# Patient Record
Sex: FEMALE | Race: WHITE | NOT HISPANIC OR LATINO | URBAN - METROPOLITAN AREA
[De-identification: names, ages, dates, MRNs, and addresses within clinical notes are randomized per-mention and may not be internally consistent; named-entity substitution may affect disease eponyms.]

---

## 2021-05-20 ENCOUNTER — OFFICE VISIT (OUTPATIENT)
Dept: URBAN - METROPOLITAN AREA CLINIC 70 | Facility: CLINIC | Age: 66
End: 2021-05-20

## 2021-05-24 ENCOUNTER — OFFICE VISIT (OUTPATIENT)
Dept: URBAN - METROPOLITAN AREA CLINIC 70 | Facility: CLINIC | Age: 66
End: 2021-05-24

## 2021-05-24 RX ORDER — TRAMADOL HYDROCHLORIDE 50 MG/1
TABLET, COATED ORAL
Qty: 60 UNSPECIFIED | Status: ACTIVE | COMMUNITY

## 2021-05-24 RX ORDER — LEVOTHYROXINE SODIUM 0.15 MG/1
TABLET ORAL
Qty: 90 UNSPECIFIED | Status: ACTIVE | COMMUNITY

## 2021-05-24 RX ORDER — NORTRIPTYLINE HYDROCHLORIDE 50 MG/1
CAPSULE ORAL
Qty: 90 UNSPECIFIED | Status: ACTIVE | COMMUNITY

## 2021-05-24 RX ORDER — CEPHALEXIN 500 MG/1
CAPSULE ORAL
Qty: 30 UNSPECIFIED | Status: ACTIVE | COMMUNITY

## 2021-05-24 RX ORDER — QUETIAPINE 400 MG/1
TABLET, FILM COATED, EXTENDED RELEASE ORAL
Qty: 90 UNSPECIFIED | Status: ACTIVE | COMMUNITY

## 2021-05-24 RX ORDER — HYDROCODONE BITARTRATE AND ACETAMINOPHEN 7.5; 325 MG/1; MG/1
TABLET ORAL
Qty: 30 UNSPECIFIED | Status: ACTIVE | COMMUNITY

## 2021-05-24 RX ORDER — POTASSIUM CHLORIDE 750 MG/1
CAPSULE, EXTENDED RELEASE ORAL
Qty: 90 UNSPECIFIED | Status: ACTIVE | COMMUNITY

## 2021-05-24 RX ORDER — ATENOLOL 50 MG/1
TABLET ORAL
Qty: 90 UNSPECIFIED | Status: ACTIVE | COMMUNITY

## 2021-05-24 RX ORDER — PEN NEEDLE, DIABETIC 31 GX5/16"
NEEDLE, DISPOSABLE MISCELLANEOUS
Qty: 200 UNSPECIFIED | Status: ACTIVE | COMMUNITY

## 2021-05-24 RX ORDER — SERTRALINE HYDROCHLORIDE 100 MG/1
TABLET, FILM COATED ORAL
Qty: 90 UNSPECIFIED | Status: ACTIVE | COMMUNITY

## 2021-05-24 RX ORDER — NITROFURANTOIN (MONOHYDRATE/MACROCRYSTALS) 75; 25 MG/1; MG/1
CAPSULE ORAL
Qty: 20 UNSPECIFIED | Status: ACTIVE | COMMUNITY

## 2021-05-24 RX ORDER — NITROFURANTOIN MACROCRYSTALS 100 MG/1
CAPSULE ORAL
Qty: 25 UNSPECIFIED | Status: ACTIVE | COMMUNITY

## 2021-05-24 RX ORDER — HUMAN INSULIN 100 [IU]/ML
INJECTION, SUSPENSION SUBCUTANEOUS
Qty: 60 UNSPECIFIED | Status: ACTIVE | COMMUNITY

## 2021-05-24 RX ORDER — FOLIC ACID 1 MG/1
TABLET ORAL
Qty: 90 UNSPECIFIED | Status: ACTIVE | COMMUNITY

## 2021-05-24 RX ORDER — TORSEMIDE 20 MG/1
TABLET ORAL
Qty: 90 UNSPECIFIED | Status: ACTIVE | COMMUNITY

## 2021-05-24 RX ORDER — TOPIRAMATE 50 MG/1
CAPSULE, EXTENDED RELEASE ORAL
Qty: 180 UNSPECIFIED | Status: ACTIVE | COMMUNITY

## 2021-05-24 RX ORDER — ATORVASTATIN CALCIUM 40 MG/1
TABLET ORAL
Qty: 90 UNSPECIFIED | Status: ACTIVE | COMMUNITY

## 2021-05-24 RX ORDER — INSULIN DEGLUDEC INJECTION 200 U/ML
INJECTION, SOLUTION SUBCUTANEOUS
Qty: 30 UNSPECIFIED | Status: ACTIVE | COMMUNITY

## 2021-05-24 RX ORDER — QUINAPRIL 20 MG/1
TABLET ORAL
Qty: 90 UNSPECIFIED | Status: ACTIVE | COMMUNITY

## 2021-05-25 ENCOUNTER — LAB OUTSIDE AN ENCOUNTER (OUTPATIENT)
Dept: URBAN - METROPOLITAN AREA CLINIC 70 | Facility: CLINIC | Age: 66
End: 2021-05-25

## 2021-05-25 ENCOUNTER — OFFICE VISIT (OUTPATIENT)
Dept: URBAN - METROPOLITAN AREA CLINIC 70 | Facility: CLINIC | Age: 66
End: 2021-05-25
Payer: OTHER GOVERNMENT

## 2021-05-25 ENCOUNTER — WEB ENCOUNTER (OUTPATIENT)
Dept: URBAN - METROPOLITAN AREA CLINIC 70 | Facility: CLINIC | Age: 66
End: 2021-05-25

## 2021-05-25 DIAGNOSIS — Z12.11 SCREEN FOR COLON CANCER: ICD-10-CM

## 2021-05-25 DIAGNOSIS — Z80.0 FAMILY HISTORY OF COLON CANCER: ICD-10-CM

## 2021-05-25 DIAGNOSIS — K74.60 CIRRHOSIS: ICD-10-CM

## 2021-05-25 DIAGNOSIS — K76.6 PORTAL HYPERTENSION: ICD-10-CM

## 2021-05-25 DIAGNOSIS — D69.6 THROMBOCYTOPENIA: ICD-10-CM

## 2021-05-25 PROCEDURE — 99204 OFFICE O/P NEW MOD 45 MIN: CPT | Performed by: INTERNAL MEDICINE

## 2021-05-25 RX ORDER — CEPHALEXIN 500 MG/1
CAPSULE ORAL
Qty: 30 UNSPECIFIED | Status: ACTIVE | COMMUNITY

## 2021-05-25 RX ORDER — ATORVASTATIN CALCIUM 40 MG/1
TABLET ORAL
Qty: 90 UNSPECIFIED | Status: ACTIVE | COMMUNITY

## 2021-05-25 RX ORDER — TRAMADOL HYDROCHLORIDE 50 MG/1
TABLET, COATED ORAL
Qty: 60 UNSPECIFIED | Status: ACTIVE | COMMUNITY

## 2021-05-25 RX ORDER — FOLIC ACID 1 MG/1
TABLET ORAL
Qty: 90 UNSPECIFIED | Status: ACTIVE | COMMUNITY

## 2021-05-25 RX ORDER — TORSEMIDE 20 MG/1
TABLET ORAL
Qty: 90 UNSPECIFIED | Status: ACTIVE | COMMUNITY

## 2021-05-25 RX ORDER — TOPIRAMATE 50 MG/1
CAPSULE, EXTENDED RELEASE ORAL
Qty: 180 UNSPECIFIED | Status: ACTIVE | COMMUNITY

## 2021-05-25 RX ORDER — NITROFURANTOIN MACROCRYSTALS 100 MG/1
CAPSULE ORAL
Qty: 25 UNSPECIFIED | Status: ACTIVE | COMMUNITY

## 2021-05-25 RX ORDER — ATENOLOL 50 MG/1
TABLET ORAL
Qty: 90 UNSPECIFIED | Status: ACTIVE | COMMUNITY

## 2021-05-25 RX ORDER — NORTRIPTYLINE HYDROCHLORIDE 50 MG/1
CAPSULE ORAL
Qty: 90 UNSPECIFIED | Status: ACTIVE | COMMUNITY

## 2021-05-25 RX ORDER — NITROFURANTOIN (MONOHYDRATE/MACROCRYSTALS) 75; 25 MG/1; MG/1
CAPSULE ORAL
Qty: 20 UNSPECIFIED | Status: ACTIVE | COMMUNITY

## 2021-05-25 RX ORDER — LEVOTHYROXINE SODIUM 0.15 MG/1
TABLET ORAL
Qty: 90 UNSPECIFIED | Status: ACTIVE | COMMUNITY

## 2021-05-25 RX ORDER — QUINAPRIL 20 MG/1
TABLET ORAL
Qty: 90 UNSPECIFIED | Status: ACTIVE | COMMUNITY

## 2021-05-25 RX ORDER — INSULIN DEGLUDEC INJECTION 200 U/ML
INJECTION, SOLUTION SUBCUTANEOUS
Qty: 30 UNSPECIFIED | Status: ACTIVE | COMMUNITY

## 2021-05-25 RX ORDER — HYDROCODONE BITARTRATE AND ACETAMINOPHEN 7.5; 325 MG/1; MG/1
TABLET ORAL
Qty: 30 UNSPECIFIED | Status: ACTIVE | COMMUNITY

## 2021-05-25 RX ORDER — POTASSIUM CHLORIDE 750 MG/1
CAPSULE, EXTENDED RELEASE ORAL
Qty: 90 UNSPECIFIED | Status: ACTIVE | COMMUNITY

## 2021-05-25 RX ORDER — HUMAN INSULIN 100 [IU]/ML
INJECTION, SUSPENSION SUBCUTANEOUS
Qty: 60 UNSPECIFIED | Status: ACTIVE | COMMUNITY

## 2021-05-25 RX ORDER — PEN NEEDLE, DIABETIC 31 GX5/16"
NEEDLE, DISPOSABLE MISCELLANEOUS
Qty: 200 UNSPECIFIED | Status: ACTIVE | COMMUNITY

## 2021-05-25 RX ORDER — QUETIAPINE 400 MG/1
TABLET, FILM COATED, EXTENDED RELEASE ORAL
Qty: 90 UNSPECIFIED | Status: ACTIVE | COMMUNITY

## 2021-05-25 RX ORDER — SERTRALINE HYDROCHLORIDE 100 MG/1
TABLET, FILM COATED ORAL
Qty: 90 UNSPECIFIED | Status: ACTIVE | COMMUNITY

## 2021-05-25 NOTE — HPI-TODAY'S VISIT:
Pt presents for evaluation of cirrhosis. They were diagnosed with cirrhosis 1 month ago. Cause of cirrhosis is unknown. They currently do not drink alcohol. No significant alcohol use in the past. They deny drug use currently or in the past. Recent labs include CBC with hgb 10.2, MCV 91 and platelet 93,000. Recent imaging includes CT scan on 4/12/21 that showed cirrhosis, gallstones, mild splenomegaly, varices in the LUQ, and bilateral nonobstructing kidney stones. EGD has been done 5years ago in [facility] and was reportedly normal. She also reports a normal colonoscopy 5 years ago. Her father had colon cancer.

## 2021-06-17 ENCOUNTER — OFFICE VISIT (OUTPATIENT)
Dept: URBAN - METROPOLITAN AREA CLINIC 70 | Facility: CLINIC | Age: 66
End: 2021-06-17

## 2021-06-24 ENCOUNTER — TELEPHONE ENCOUNTER (OUTPATIENT)
Dept: URBAN - METROPOLITAN AREA SURGERY CENTER 30 | Facility: SURGERY CENTER | Age: 66
End: 2021-06-24

## 2021-08-27 ENCOUNTER — OFFICE VISIT (OUTPATIENT)
Dept: URBAN - METROPOLITAN AREA MEDICAL CENTER 42 | Facility: MEDICAL CENTER | Age: 66
End: 2021-08-27

## 2021-11-26 ENCOUNTER — OUT OF OFFICE VISIT (OUTPATIENT)
Dept: URBAN - METROPOLITAN AREA MEDICAL CENTER 9 | Facility: MEDICAL CENTER | Age: 66
End: 2021-11-26
Payer: OTHER GOVERNMENT

## 2021-11-26 DIAGNOSIS — K92.1 ACUTE MELENA: ICD-10-CM

## 2021-11-26 DIAGNOSIS — K31.89 ACQUIRED DEFORMITY OF DUODENUM: ICD-10-CM

## 2021-11-26 DIAGNOSIS — K31.811 ACQUIRED ARTERIOVENOUS MALFORMATION OF STOMACH WITH HEMORRHAGE: ICD-10-CM

## 2021-11-26 DIAGNOSIS — K21.9 ACID REFLUX: ICD-10-CM

## 2021-11-26 DIAGNOSIS — D64.89 ANEMIA DUE TO OTHER CAUSE: ICD-10-CM

## 2021-11-26 DIAGNOSIS — K74.69 CIRRHOSIS, CRYPTOGENIC: ICD-10-CM

## 2021-11-26 PROCEDURE — 43255 EGD CONTROL BLEEDING ANY: CPT | Performed by: STUDENT IN AN ORGANIZED HEALTH CARE EDUCATION/TRAINING PROGRAM

## 2021-11-26 PROCEDURE — G8427 DOCREV CUR MEDS BY ELIG CLIN: HCPCS | Performed by: STUDENT IN AN ORGANIZED HEALTH CARE EDUCATION/TRAINING PROGRAM

## 2021-11-26 PROCEDURE — 43239 EGD BIOPSY SINGLE/MULTIPLE: CPT | Performed by: STUDENT IN AN ORGANIZED HEALTH CARE EDUCATION/TRAINING PROGRAM

## 2021-11-26 PROCEDURE — 99233 SBSQ HOSP IP/OBS HIGH 50: CPT | Performed by: STUDENT IN AN ORGANIZED HEALTH CARE EDUCATION/TRAINING PROGRAM

## 2021-11-26 PROCEDURE — 99222 1ST HOSP IP/OBS MODERATE 55: CPT | Performed by: STUDENT IN AN ORGANIZED HEALTH CARE EDUCATION/TRAINING PROGRAM

## 2022-01-20 ENCOUNTER — LAB OUTSIDE AN ENCOUNTER (OUTPATIENT)
Dept: URBAN - METROPOLITAN AREA CLINIC 70 | Facility: CLINIC | Age: 67
End: 2022-01-20

## 2022-01-20 ENCOUNTER — OFFICE VISIT (OUTPATIENT)
Dept: URBAN - METROPOLITAN AREA CLINIC 70 | Facility: CLINIC | Age: 67
End: 2022-01-20
Payer: OTHER GOVERNMENT

## 2022-01-20 DIAGNOSIS — Z12.11 SCREEN FOR COLON CANCER: ICD-10-CM

## 2022-01-20 DIAGNOSIS — D50.9 CHRONIC IRON DEFICIENCY ANEMIA: ICD-10-CM

## 2022-01-20 DIAGNOSIS — K74.60 CIRRHOSIS: ICD-10-CM

## 2022-01-20 DIAGNOSIS — Z80.0 FAMILY HISTORY OF COLON CANCER: ICD-10-CM

## 2022-01-20 DIAGNOSIS — K31.819 GAVE (GASTRIC ANTRAL VASCULAR ECTASIA): ICD-10-CM

## 2022-01-20 DIAGNOSIS — K27.9 PEPTIC ULCER DISEASE: ICD-10-CM

## 2022-01-20 PROCEDURE — 99214 OFFICE O/P EST MOD 30 MIN: CPT | Performed by: NURSE PRACTITIONER

## 2022-01-20 RX ORDER — FOLIC ACID 1 MG/1
TABLET ORAL
Qty: 90 UNSPECIFIED | Status: ACTIVE | COMMUNITY

## 2022-01-20 RX ORDER — HUMAN INSULIN 100 [IU]/ML
INJECTION, SUSPENSION SUBCUTANEOUS
Qty: 60 UNSPECIFIED | Status: ACTIVE | COMMUNITY

## 2022-01-20 RX ORDER — TOPIRAMATE 50 MG/1
CAPSULE, EXTENDED RELEASE ORAL
Qty: 180 UNSPECIFIED | Status: ACTIVE | COMMUNITY

## 2022-01-20 RX ORDER — INSULIN DEGLUDEC INJECTION 200 U/ML
INJECTION, SOLUTION SUBCUTANEOUS
Qty: 30 UNSPECIFIED | Status: ACTIVE | COMMUNITY

## 2022-01-20 RX ORDER — CEPHALEXIN 500 MG/1
CAPSULE ORAL
Qty: 30 UNSPECIFIED | Status: ACTIVE | COMMUNITY

## 2022-01-20 RX ORDER — NITROFURANTOIN (MONOHYDRATE/MACROCRYSTALS) 75; 25 MG/1; MG/1
CAPSULE ORAL
Qty: 20 UNSPECIFIED | Status: ACTIVE | COMMUNITY

## 2022-01-20 RX ORDER — HYDROCODONE BITARTRATE AND ACETAMINOPHEN 7.5; 325 MG/1; MG/1
TABLET ORAL
Qty: 30 UNSPECIFIED | Status: ACTIVE | COMMUNITY

## 2022-01-20 RX ORDER — TRAMADOL HYDROCHLORIDE 50 MG/1
TABLET, COATED ORAL
Qty: 60 UNSPECIFIED | Status: ACTIVE | COMMUNITY

## 2022-01-20 RX ORDER — ATORVASTATIN CALCIUM 40 MG/1
TABLET ORAL
Qty: 90 UNSPECIFIED | Status: ACTIVE | COMMUNITY

## 2022-01-20 RX ORDER — NORTRIPTYLINE HYDROCHLORIDE 50 MG/1
CAPSULE ORAL
Qty: 90 UNSPECIFIED | Status: ACTIVE | COMMUNITY

## 2022-01-20 RX ORDER — POTASSIUM CHLORIDE 750 MG/1
CAPSULE, EXTENDED RELEASE ORAL
Qty: 90 UNSPECIFIED | Status: ACTIVE | COMMUNITY

## 2022-01-20 RX ORDER — NITROFURANTOIN MACROCRYSTALS 100 MG/1
CAPSULE ORAL
Qty: 25 UNSPECIFIED | Status: ACTIVE | COMMUNITY

## 2022-01-20 RX ORDER — ATENOLOL 50 MG/1
TABLET ORAL
Qty: 90 UNSPECIFIED | Status: ACTIVE | COMMUNITY

## 2022-01-20 RX ORDER — SERTRALINE HYDROCHLORIDE 100 MG/1
TABLET, FILM COATED ORAL
Qty: 90 UNSPECIFIED | Status: ACTIVE | COMMUNITY

## 2022-01-20 RX ORDER — PEN NEEDLE, DIABETIC 31 GX5/16"
NEEDLE, DISPOSABLE MISCELLANEOUS
Qty: 200 UNSPECIFIED | Status: ACTIVE | COMMUNITY

## 2022-01-20 RX ORDER — LEVOTHYROXINE SODIUM 0.15 MG/1
TABLET ORAL
Qty: 90 UNSPECIFIED | Status: ACTIVE | COMMUNITY

## 2022-01-20 RX ORDER — ATENOLOL 50 MG/1
TABLET ORAL
OUTPATIENT

## 2022-01-20 RX ORDER — QUETIAPINE 400 MG/1
TABLET, FILM COATED, EXTENDED RELEASE ORAL
Qty: 90 UNSPECIFIED | Status: ACTIVE | COMMUNITY

## 2022-01-20 RX ORDER — PANTOPRAZOLE SODIUM 40 MG/1
1 TABLET TABLET, DELAYED RELEASE ORAL TWICE A DAY
Qty: 180 TABLET | Refills: 3 | OUTPATIENT
Start: 2022-01-20

## 2022-01-20 RX ORDER — QUINAPRIL 20 MG/1
TABLET ORAL
Qty: 90 UNSPECIFIED | Status: ACTIVE | COMMUNITY

## 2022-01-20 RX ORDER — TORSEMIDE 20 MG/1
TABLET ORAL
Qty: 90 UNSPECIFIED | Status: ACTIVE | COMMUNITY

## 2022-01-20 NOTE — HPI-TODAY'S VISIT:
OV 5/25/21: Pt presents for evaluation of cirrhosis. They were diagnosed with cirrhosis 1 month ago. Cause of cirrhosis is unknown. They currently do not drink alcohol. No significant alcohol use in the past. They deny drug use currently or in the past. Recent labs include CBC with hgb 10.2, MCV 91 and platelet 93,000. Recent imaging includes CT scan on 4/12/21 that showed cirrhosis, gallstones, mild splenomegaly, varices in the LUQ, and bilateral nonobstructing kidney stones. EGD has been done 5years ago in [place] and was reportedly normal. She also reports a normal colonoscopy 5 years ago. Her father had colon cancer. --------------------------------------------------------------------------------------------------- Today: 1/20/22: Since last OV, patient has has been hospitalized twice for UGIB/anemia. 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 Her second episode was a hospitalization from 12/13/21 to 12/18/21 for melena/anemia with Hgb on admission found to be 6.8 requiring transfusion with PRBCs with repeat EGD on 12/15/21 with findings of no esophageal varices, gastric ulcer  located at the distal antrum at the greater curvaure with visible vessel s/p endoclip x 2, and mild portal hypertension at the  with mosaic pattern mucosa but no gastric varices. She was discharged home with H/H 8.5/30.4 on atenolol, iron supplement, lactulose, and pantoprazole BID. Today she reports dark brown stools with taking iron but no active signs of bleeding such as hematemesis, melena, or hematochezia. Has not been taking PPI since discharge (off ~ 1 month). Previously ordered labs at last OV to r/o autoimmune or viral hepatitis has not been completed.  Denies CP, SOB, dizziness/lightheadedness, abdominal pain, N/V, wt loss, constipation, or diarrhea.

## 2022-01-23 PROBLEM — 34742003: Status: ACTIVE | Noted: 2021-05-25

## 2022-01-23 PROBLEM — 415116008: Status: ACTIVE | Noted: 2021-05-25

## 2022-03-23 ENCOUNTER — OFFICE VISIT (OUTPATIENT)
Dept: URBAN - METROPOLITAN AREA MEDICAL CENTER 42 | Facility: MEDICAL CENTER | Age: 67
End: 2022-03-23

## 2022-06-08 ENCOUNTER — OFFICE VISIT (OUTPATIENT)
Dept: URBAN - METROPOLITAN AREA MEDICAL CENTER 42 | Facility: MEDICAL CENTER | Age: 67
End: 2022-06-08

## 2022-06-08 ENCOUNTER — OFFICE VISIT (OUTPATIENT)
Dept: URBAN - METROPOLITAN AREA MEDICAL CENTER 42 | Facility: MEDICAL CENTER | Age: 67
End: 2022-06-08
Payer: OTHER GOVERNMENT

## 2022-06-08 DIAGNOSIS — K31.819 ACQUIRED ARTERIOVENOUS MALFORMATION OF STOMACH: ICD-10-CM

## 2022-06-08 PROCEDURE — 43270 EGD LESION ABLATION: CPT | Performed by: INTERNAL MEDICINE

## 2022-07-21 ENCOUNTER — OFFICE VISIT (OUTPATIENT)
Dept: URBAN - METROPOLITAN AREA CLINIC 70 | Facility: CLINIC | Age: 67
End: 2022-07-21
Payer: OTHER GOVERNMENT

## 2022-07-21 ENCOUNTER — LAB OUTSIDE AN ENCOUNTER (OUTPATIENT)
Dept: URBAN - METROPOLITAN AREA CLINIC 70 | Facility: CLINIC | Age: 67
End: 2022-07-21

## 2022-07-21 VITALS
HEIGHT: 65 IN | SYSTOLIC BLOOD PRESSURE: 116 MMHG | DIASTOLIC BLOOD PRESSURE: 69 MMHG | WEIGHT: 293 LBS | TEMPERATURE: 98.9 F | BODY MASS INDEX: 48.82 KG/M2 | HEART RATE: 60 BPM

## 2022-07-21 DIAGNOSIS — Z80.0 FAMILY HISTORY OF COLON CANCER: ICD-10-CM

## 2022-07-21 DIAGNOSIS — K31.819 GAVE (GASTRIC ANTRAL VASCULAR ECTASIA): ICD-10-CM

## 2022-07-21 DIAGNOSIS — K74.60 CIRRHOSIS: ICD-10-CM

## 2022-07-21 DIAGNOSIS — K27.9 PEPTIC ULCER DISEASE: ICD-10-CM

## 2022-07-21 DIAGNOSIS — D50.9 CHRONIC IRON DEFICIENCY ANEMIA: ICD-10-CM

## 2022-07-21 DIAGNOSIS — Z12.11 SCREEN FOR COLON CANCER: ICD-10-CM

## 2022-07-21 PROBLEM — 87522002: Status: ACTIVE | Noted: 2022-01-20

## 2022-07-21 PROBLEM — 13200003: Status: ACTIVE | Noted: 2022-01-20

## 2022-07-21 PROCEDURE — 99214 OFFICE O/P EST MOD 30 MIN: CPT | Performed by: NURSE PRACTITIONER

## 2022-07-21 RX ORDER — NITROFURANTOIN (MONOHYDRATE/MACROCRYSTALS) 75; 25 MG/1; MG/1
CAPSULE ORAL
Qty: 20 UNSPECIFIED | Status: ACTIVE | COMMUNITY

## 2022-07-21 RX ORDER — SERTRALINE HYDROCHLORIDE 100 MG/1
TABLET, FILM COATED ORAL
Qty: 90 UNSPECIFIED | Status: ACTIVE | COMMUNITY

## 2022-07-21 RX ORDER — QUINAPRIL 20 MG/1
TABLET ORAL
Qty: 90 UNSPECIFIED | Status: ACTIVE | COMMUNITY

## 2022-07-21 RX ORDER — ATORVASTATIN CALCIUM 40 MG/1
TABLET ORAL
Qty: 90 UNSPECIFIED | Status: ACTIVE | COMMUNITY

## 2022-07-21 RX ORDER — ATENOLOL 50 MG/1
TABLET ORAL
OUTPATIENT

## 2022-07-21 RX ORDER — LEVOTHYROXINE SODIUM 0.15 MG/1
TABLET ORAL
Qty: 90 UNSPECIFIED | Status: ACTIVE | COMMUNITY

## 2022-07-21 RX ORDER — TOPIRAMATE 50 MG/1
CAPSULE, EXTENDED RELEASE ORAL
Qty: 180 UNSPECIFIED | Status: ACTIVE | COMMUNITY

## 2022-07-21 RX ORDER — HYDROCODONE BITARTRATE AND ACETAMINOPHEN 7.5; 325 MG/1; MG/1
TABLET ORAL
Qty: 30 UNSPECIFIED | Status: ACTIVE | COMMUNITY

## 2022-07-21 RX ORDER — CEPHALEXIN 500 MG/1
CAPSULE ORAL
Qty: 30 UNSPECIFIED | Status: ACTIVE | COMMUNITY

## 2022-07-21 RX ORDER — TRAMADOL HYDROCHLORIDE 50 MG/1
TABLET, COATED ORAL
Qty: 60 UNSPECIFIED | Status: ACTIVE | COMMUNITY

## 2022-07-21 RX ORDER — LACTULOSE 10 G/15ML
15 ML AS NEEDED SOLUTION ORAL ONCE A DAY
OUTPATIENT

## 2022-07-21 RX ORDER — HUMAN INSULIN 100 [IU]/ML
INJECTION, SUSPENSION SUBCUTANEOUS
Qty: 60 UNSPECIFIED | Status: ACTIVE | COMMUNITY

## 2022-07-21 RX ORDER — POTASSIUM CHLORIDE 750 MG/1
CAPSULE, EXTENDED RELEASE ORAL
Qty: 90 UNSPECIFIED | Status: ACTIVE | COMMUNITY

## 2022-07-21 RX ORDER — INSULIN DEGLUDEC INJECTION 200 U/ML
INJECTION, SOLUTION SUBCUTANEOUS
Qty: 30 UNSPECIFIED | Status: ACTIVE | COMMUNITY

## 2022-07-21 RX ORDER — PANTOPRAZOLE SODIUM 40 MG/1
1 TABLET TABLET, DELAYED RELEASE ORAL TWICE A DAY
OUTPATIENT
Start: 2022-01-20

## 2022-07-21 RX ORDER — PANTOPRAZOLE SODIUM 40 MG/1
1 TABLET TABLET, DELAYED RELEASE ORAL TWICE A DAY
Qty: 180 TABLET | Refills: 3 | Status: ACTIVE | COMMUNITY
Start: 2022-01-20

## 2022-07-21 RX ORDER — QUETIAPINE 400 MG/1
TABLET, FILM COATED, EXTENDED RELEASE ORAL
Qty: 90 UNSPECIFIED | Status: ACTIVE | COMMUNITY

## 2022-07-21 RX ORDER — TORSEMIDE 20 MG/1
TABLET ORAL
Qty: 90 UNSPECIFIED | Status: ACTIVE | COMMUNITY

## 2022-07-21 RX ORDER — NITROFURANTOIN MACROCRYSTALS 100 MG/1
CAPSULE ORAL
Qty: 25 UNSPECIFIED | Status: ACTIVE | COMMUNITY

## 2022-07-21 RX ORDER — ATENOLOL 50 MG/1
TABLET ORAL
Status: ACTIVE | COMMUNITY

## 2022-07-21 RX ORDER — LACTULOSE 10 G/15ML
15 ML AS NEEDED SOLUTION ORAL ONCE A DAY
Status: ACTIVE | COMMUNITY

## 2022-07-21 RX ORDER — NORTRIPTYLINE HYDROCHLORIDE 50 MG/1
CAPSULE ORAL
Qty: 90 UNSPECIFIED | Status: ACTIVE | COMMUNITY

## 2022-07-21 RX ORDER — FOLIC ACID 1 MG/1
TABLET ORAL
Qty: 90 UNSPECIFIED | Status: ACTIVE | COMMUNITY

## 2022-07-21 RX ORDER — PEN NEEDLE, DIABETIC 31 GX5/16"
NEEDLE, DISPOSABLE MISCELLANEOUS
Qty: 200 UNSPECIFIED | Status: ACTIVE | COMMUNITY

## 2022-07-21 NOTE — HPI-TODAY'S VISIT:
OV 5/25/21: Pt presents for evaluation of cirrhosis. They were diagnosed with cirrhosis 1 month ago. Cause of cirrhosis is unknown. They currently do not drink alcohol. No significant alcohol use in the past. They deny drug use currently or in the past. Recent labs include CBC with hgb 10.2, MCV 91 and platelet 93,000. Recent imaging includes CT scan on 4/12/21 that showed cirrhosis, gallstones, mild splenomegaly, varices in the LUQ, and bilateral nonobstructing kidney stones. EGD has been done 5years ago in [place] and was reportedly normal. She also reports a normal colonoscopy 5 years ago. Her father had colon cancer. --------------------------------------------------------------------------------------------------- OV 1/20/22: Since last OV, patient has has been hospitalized twice for UGIB/anemia. 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 Her second episode was a hospitalization from 12/13/21 to 12/18/21 for melena/anemia with Hgb on admission found to be 6.8 requiring transfusion with PRBCs with repeat EGD on 12/15/21 with findings of no esophageal varices, gastric ulcer  located at the distal antrum at the greater curvaure with visible vessel s/p endoclip x 2, and mild portal hypertension at the  with mosaic pattern mucosa but no gastric varices. She was discharged home with H/H 8.5/30.4 on atenolol, iron supplement, lactulose, and pantoprazole BID. Today she reports dark brown stools with taking iron but no active signs of bleeding such as hematemesis, melena, or hematochezia. Has not been taking PPI since discharge (off ~ 1 month). Previously ordered labs at last OV to r/o autoimmune or viral hepatitis has not been completed.  Denies CP, SOB, dizziness/lightheadedness, abdominal pain, N/V, wt loss, constipation, or diarrhea. --------------------------------------------------------------------------- Today 7/21/22: Patient presents today for follow up. She underwent repeat EGD on 6/8/22 with findings of no varices, ulcer healed and GAVE with no active bleeding but treated with APC. Most recent labs on 7/2/22 showed H/H improving at 8.4/26.9 along with plt 104, T.ibll 1.3 but alk phos, AST, ALT WNL. Today she reports doing well. No active signs of GI bleeding such as hematemesis, melena, or hematochezia. Compliant with taking daily PPI. No evidence of jaundice, ascites, or HE. No new or current GI complaints or concerns.

## 2022-11-17 ENCOUNTER — OFFICE VISIT (OUTPATIENT)
Dept: URBAN - METROPOLITAN AREA MEDICAL CENTER 42 | Facility: MEDICAL CENTER | Age: 67
End: 2022-11-17
Payer: OTHER GOVERNMENT

## 2022-11-17 DIAGNOSIS — Z53.8 FAILED ATTEMPTED SURGICAL PROCEDURE: ICD-10-CM

## 2022-11-17 DIAGNOSIS — Z12.11 COLON CANCER SCREENING: ICD-10-CM

## 2022-11-17 PROCEDURE — 45378 DIAGNOSTIC COLONOSCOPY: CPT | Performed by: INTERNAL MEDICINE

## 2022-11-17 RX ORDER — PEN NEEDLE, DIABETIC 31 GX5/16"
NEEDLE, DISPOSABLE MISCELLANEOUS
Qty: 200 UNSPECIFIED | Status: ACTIVE | COMMUNITY

## 2022-11-17 RX ORDER — TRAMADOL HYDROCHLORIDE 50 MG/1
TABLET, COATED ORAL
Qty: 60 UNSPECIFIED | Status: ACTIVE | COMMUNITY

## 2022-11-17 RX ORDER — ATENOLOL 50 MG/1
TABLET ORAL
Status: ACTIVE | COMMUNITY

## 2022-11-17 RX ORDER — SERTRALINE HYDROCHLORIDE 100 MG/1
TABLET, FILM COATED ORAL
Qty: 90 UNSPECIFIED | Status: ACTIVE | COMMUNITY

## 2022-11-17 RX ORDER — HYDROCODONE BITARTRATE AND ACETAMINOPHEN 7.5; 325 MG/1; MG/1
TABLET ORAL
Qty: 30 UNSPECIFIED | Status: ACTIVE | COMMUNITY

## 2022-11-17 RX ORDER — INSULIN DEGLUDEC INJECTION 200 U/ML
INJECTION, SOLUTION SUBCUTANEOUS
Qty: 30 UNSPECIFIED | Status: ACTIVE | COMMUNITY

## 2022-11-17 RX ORDER — NITROFURANTOIN MACROCRYSTALS 100 MG/1
CAPSULE ORAL
Qty: 25 UNSPECIFIED | Status: ACTIVE | COMMUNITY

## 2022-11-17 RX ORDER — ATORVASTATIN CALCIUM 40 MG/1
TABLET ORAL
Qty: 90 UNSPECIFIED | Status: ACTIVE | COMMUNITY

## 2022-11-17 RX ORDER — CEPHALEXIN 500 MG/1
CAPSULE ORAL
Qty: 30 UNSPECIFIED | Status: ACTIVE | COMMUNITY

## 2022-11-17 RX ORDER — PANTOPRAZOLE SODIUM 40 MG/1
1 TABLET TABLET, DELAYED RELEASE ORAL TWICE A DAY
Status: ACTIVE | COMMUNITY
Start: 2022-01-20

## 2022-11-17 RX ORDER — QUETIAPINE 400 MG/1
TABLET, FILM COATED, EXTENDED RELEASE ORAL
Qty: 90 UNSPECIFIED | Status: ACTIVE | COMMUNITY

## 2022-11-17 RX ORDER — LACTULOSE 10 G/15ML
15 ML AS NEEDED SOLUTION ORAL ONCE A DAY
Status: ACTIVE | COMMUNITY

## 2022-11-17 RX ORDER — NORTRIPTYLINE HYDROCHLORIDE 50 MG/1
CAPSULE ORAL
Qty: 90 UNSPECIFIED | Status: ACTIVE | COMMUNITY

## 2022-11-17 RX ORDER — QUINAPRIL 20 MG/1
TABLET ORAL
Qty: 90 UNSPECIFIED | Status: ACTIVE | COMMUNITY

## 2022-11-17 RX ORDER — NITROFURANTOIN (MONOHYDRATE/MACROCRYSTALS) 75; 25 MG/1; MG/1
CAPSULE ORAL
Qty: 20 UNSPECIFIED | Status: ACTIVE | COMMUNITY

## 2022-11-17 RX ORDER — POTASSIUM CHLORIDE 750 MG/1
CAPSULE, EXTENDED RELEASE ORAL
Qty: 90 UNSPECIFIED | Status: ACTIVE | COMMUNITY

## 2022-11-17 RX ORDER — FOLIC ACID 1 MG/1
TABLET ORAL
Qty: 90 UNSPECIFIED | Status: ACTIVE | COMMUNITY

## 2022-11-17 RX ORDER — LEVOTHYROXINE SODIUM 0.15 MG/1
TABLET ORAL
Qty: 90 UNSPECIFIED | Status: ACTIVE | COMMUNITY

## 2022-11-17 RX ORDER — TORSEMIDE 20 MG/1
TABLET ORAL
Qty: 90 UNSPECIFIED | Status: ACTIVE | COMMUNITY

## 2022-11-17 RX ORDER — HUMAN INSULIN 100 [IU]/ML
INJECTION, SUSPENSION SUBCUTANEOUS
Qty: 60 UNSPECIFIED | Status: ACTIVE | COMMUNITY

## 2022-11-17 RX ORDER — TOPIRAMATE 50 MG/1
CAPSULE, EXTENDED RELEASE ORAL
Qty: 180 UNSPECIFIED | Status: ACTIVE | COMMUNITY

## 2022-11-25 ENCOUNTER — WEB ENCOUNTER (OUTPATIENT)
Dept: URBAN - METROPOLITAN AREA CLINIC 70 | Facility: CLINIC | Age: 67
End: 2022-11-25

## 2022-11-28 ENCOUNTER — OUT OF OFFICE VISIT (OUTPATIENT)
Dept: URBAN - METROPOLITAN AREA MEDICAL CENTER 42 | Facility: MEDICAL CENTER | Age: 67
End: 2022-11-28
Payer: OTHER GOVERNMENT

## 2022-11-28 DIAGNOSIS — K31.819 ACQUIRED ARTERIOVENOUS MALFORMATION OF DUODENUM: ICD-10-CM

## 2022-11-28 DIAGNOSIS — D62 ABLA (ACUTE BLOOD LOSS ANEMIA): ICD-10-CM

## 2022-11-28 PROCEDURE — 99222 1ST HOSP IP/OBS MODERATE 55: CPT | Performed by: INTERNAL MEDICINE

## 2022-11-28 PROCEDURE — 43270 EGD LESION ABLATION: CPT | Performed by: INTERNAL MEDICINE

## 2022-11-28 PROCEDURE — 99232 SBSQ HOSP IP/OBS MODERATE 35: CPT | Performed by: INTERNAL MEDICINE

## 2022-11-28 PROCEDURE — G8427 DOCREV CUR MEDS BY ELIG CLIN: HCPCS | Performed by: INTERNAL MEDICINE

## 2022-11-30 ENCOUNTER — OFFICE VISIT (OUTPATIENT)
Dept: URBAN - METROPOLITAN AREA CLINIC 70 | Facility: CLINIC | Age: 67
End: 2022-11-30

## 2022-12-01 ENCOUNTER — P2P PATIENT RECORD (OUTPATIENT)
Age: 67
End: 2022-12-01

## 2023-01-03 ENCOUNTER — OFFICE VISIT (OUTPATIENT)
Dept: URBAN - METROPOLITAN AREA CLINIC 70 | Facility: CLINIC | Age: 68
End: 2023-01-03

## 2023-01-03 VITALS — HEIGHT: 65 IN

## 2023-01-03 RX ORDER — INSULIN DEGLUDEC INJECTION 200 U/ML
INJECTION, SOLUTION SUBCUTANEOUS
Qty: 30 UNSPECIFIED | COMMUNITY

## 2023-01-03 RX ORDER — TRAMADOL HYDROCHLORIDE 50 MG/1
TABLET, COATED ORAL
Qty: 60 UNSPECIFIED | COMMUNITY

## 2023-01-03 RX ORDER — NITROFURANTOIN (MONOHYDRATE/MACROCRYSTALS) 75; 25 MG/1; MG/1
CAPSULE ORAL
Qty: 20 UNSPECIFIED | COMMUNITY

## 2023-01-03 RX ORDER — ATENOLOL 50 MG/1
TABLET ORAL
COMMUNITY

## 2023-01-03 RX ORDER — QUETIAPINE 400 MG/1
TABLET, FILM COATED, EXTENDED RELEASE ORAL
Qty: 90 UNSPECIFIED | COMMUNITY

## 2023-01-03 RX ORDER — LACTULOSE 10 G/15ML
15 ML AS NEEDED SOLUTION ORAL ONCE A DAY
COMMUNITY

## 2023-01-03 RX ORDER — HUMAN INSULIN 100 [IU]/ML
INJECTION, SUSPENSION SUBCUTANEOUS
Qty: 60 UNSPECIFIED | COMMUNITY

## 2023-01-03 RX ORDER — PANTOPRAZOLE SODIUM 40 MG/1
1 TABLET TABLET, DELAYED RELEASE ORAL TWICE A DAY
COMMUNITY
Start: 2022-01-20

## 2023-01-03 RX ORDER — ATORVASTATIN CALCIUM 40 MG/1
TABLET ORAL
Qty: 90 UNSPECIFIED | COMMUNITY

## 2023-01-03 RX ORDER — TORSEMIDE 20 MG/1
TABLET ORAL
Qty: 90 UNSPECIFIED | COMMUNITY

## 2023-01-03 RX ORDER — HYDROCODONE BITARTRATE AND ACETAMINOPHEN 7.5; 325 MG/1; MG/1
TABLET ORAL
Qty: 30 UNSPECIFIED | COMMUNITY

## 2023-01-03 RX ORDER — NORTRIPTYLINE HYDROCHLORIDE 50 MG/1
CAPSULE ORAL
Qty: 90 UNSPECIFIED | COMMUNITY

## 2023-01-03 RX ORDER — NITROFURANTOIN MACROCRYSTALS 100 MG/1
CAPSULE ORAL
Qty: 25 UNSPECIFIED | COMMUNITY

## 2023-01-03 RX ORDER — QUINAPRIL 20 MG/1
TABLET ORAL
Qty: 90 UNSPECIFIED | COMMUNITY

## 2023-01-03 RX ORDER — LEVOTHYROXINE SODIUM 0.15 MG/1
TABLET ORAL
Qty: 90 UNSPECIFIED | COMMUNITY

## 2023-01-03 RX ORDER — TOPIRAMATE 50 MG/1
CAPSULE, EXTENDED RELEASE ORAL
Qty: 180 UNSPECIFIED | COMMUNITY

## 2023-01-03 RX ORDER — CEPHALEXIN 500 MG/1
CAPSULE ORAL
Qty: 30 UNSPECIFIED | COMMUNITY

## 2023-01-03 RX ORDER — PEN NEEDLE, DIABETIC 31 GX5/16"
NEEDLE, DISPOSABLE MISCELLANEOUS
Qty: 200 UNSPECIFIED | COMMUNITY

## 2023-01-03 RX ORDER — FOLIC ACID 1 MG/1
TABLET ORAL
Qty: 90 UNSPECIFIED | COMMUNITY

## 2023-01-03 RX ORDER — SERTRALINE HYDROCHLORIDE 100 MG/1
TABLET, FILM COATED ORAL
Qty: 90 UNSPECIFIED | COMMUNITY

## 2023-01-03 RX ORDER — POTASSIUM CHLORIDE 750 MG/1
CAPSULE, EXTENDED RELEASE ORAL
Qty: 90 UNSPECIFIED | COMMUNITY

## 2023-01-11 ENCOUNTER — OFFICE VISIT (OUTPATIENT)
Dept: URBAN - METROPOLITAN AREA CLINIC 70 | Facility: CLINIC | Age: 68
End: 2023-01-11
Payer: OTHER GOVERNMENT

## 2023-01-11 ENCOUNTER — LAB OUTSIDE AN ENCOUNTER (OUTPATIENT)
Dept: URBAN - METROPOLITAN AREA CLINIC 70 | Facility: CLINIC | Age: 68
End: 2023-01-11

## 2023-01-11 VITALS
SYSTOLIC BLOOD PRESSURE: 107 MMHG | HEIGHT: 65 IN | BODY MASS INDEX: 48.82 KG/M2 | HEART RATE: 54 BPM | WEIGHT: 293 LBS | DIASTOLIC BLOOD PRESSURE: 42 MMHG | TEMPERATURE: 97.3 F

## 2023-01-11 DIAGNOSIS — K31.819 GAVE (GASTRIC ANTRAL VASCULAR ECTASIA): ICD-10-CM

## 2023-01-11 DIAGNOSIS — Z80.0 FAMILY HISTORY OF COLON CANCER: ICD-10-CM

## 2023-01-11 DIAGNOSIS — Z12.11 SCREEN FOR COLON CANCER: ICD-10-CM

## 2023-01-11 DIAGNOSIS — K74.60 CIRRHOSIS: ICD-10-CM

## 2023-01-11 PROBLEM — 275978004 SCREENING FOR MALIGNANT NEOPLASM OF COLON: Status: ACTIVE | Noted: 2022-05-18

## 2023-01-11 PROBLEM — 255417007 CIRRHOTIC: Status: ACTIVE | Noted: 2022-07-21

## 2023-01-11 PROCEDURE — 99214 OFFICE O/P EST MOD 30 MIN: CPT | Performed by: REGISTERED NURSE

## 2023-01-11 RX ORDER — NORTRIPTYLINE HYDROCHLORIDE 50 MG/1
CAPSULE ORAL
Qty: 90 UNSPECIFIED | Status: ACTIVE | COMMUNITY

## 2023-01-11 RX ORDER — TRAMADOL HYDROCHLORIDE 50 MG/1
TABLET, COATED ORAL
Qty: 60 UNSPECIFIED | Status: ACTIVE | COMMUNITY

## 2023-01-11 RX ORDER — SERTRALINE HYDROCHLORIDE 100 MG/1
TABLET, FILM COATED ORAL
Qty: 90 UNSPECIFIED | Status: ACTIVE | COMMUNITY

## 2023-01-11 RX ORDER — LEVOTHYROXINE SODIUM 0.15 MG/1
TABLET ORAL
Qty: 90 UNSPECIFIED | Status: ACTIVE | COMMUNITY

## 2023-01-11 RX ORDER — PANTOPRAZOLE SODIUM 40 MG/1
1 TABLET TABLET, DELAYED RELEASE ORAL TWICE A DAY
Status: ACTIVE | COMMUNITY
Start: 2022-01-20

## 2023-01-11 RX ORDER — PEN NEEDLE, DIABETIC 31 GX5/16"
NEEDLE, DISPOSABLE MISCELLANEOUS
Qty: 200 UNSPECIFIED | Status: ACTIVE | COMMUNITY

## 2023-01-11 RX ORDER — ATORVASTATIN CALCIUM 40 MG/1
TABLET ORAL
Qty: 90 UNSPECIFIED | Status: ACTIVE | COMMUNITY

## 2023-01-11 RX ORDER — TOPIRAMATE 50 MG/1
CAPSULE, EXTENDED RELEASE ORAL
Qty: 180 UNSPECIFIED | Status: ACTIVE | COMMUNITY

## 2023-01-11 RX ORDER — ATENOLOL 50 MG/1
TABLET ORAL
Status: ACTIVE | COMMUNITY

## 2023-01-11 RX ORDER — NITROFURANTOIN MACROCRYSTALS 100 MG/1
CAPSULE ORAL
Qty: 25 UNSPECIFIED | Status: ACTIVE | COMMUNITY

## 2023-01-11 RX ORDER — TORSEMIDE 20 MG/1
TABLET ORAL
Qty: 90 UNSPECIFIED | Status: ACTIVE | COMMUNITY

## 2023-01-11 RX ORDER — QUETIAPINE 400 MG/1
TABLET, FILM COATED, EXTENDED RELEASE ORAL
Qty: 90 UNSPECIFIED | Status: ACTIVE | COMMUNITY

## 2023-01-11 RX ORDER — HYDROCODONE BITARTRATE AND ACETAMINOPHEN 7.5; 325 MG/1; MG/1
TABLET ORAL
Qty: 30 UNSPECIFIED | Status: ACTIVE | COMMUNITY

## 2023-01-11 RX ORDER — HUMAN INSULIN 100 [IU]/ML
INJECTION, SUSPENSION SUBCUTANEOUS
Qty: 60 UNSPECIFIED | Status: ACTIVE | COMMUNITY

## 2023-01-11 RX ORDER — LACTULOSE 10 G/15ML
15 ML AS NEEDED SOLUTION ORAL ONCE A DAY
Status: ACTIVE | COMMUNITY

## 2023-01-11 RX ORDER — POTASSIUM CHLORIDE 750 MG/1
CAPSULE, EXTENDED RELEASE ORAL
Qty: 90 UNSPECIFIED | Status: ACTIVE | COMMUNITY

## 2023-01-11 RX ORDER — QUINAPRIL 20 MG/1
TABLET ORAL
Qty: 90 UNSPECIFIED | Status: ACTIVE | COMMUNITY

## 2023-01-11 RX ORDER — INSULIN DEGLUDEC INJECTION 200 U/ML
INJECTION, SOLUTION SUBCUTANEOUS
Qty: 30 UNSPECIFIED | Status: ACTIVE | COMMUNITY

## 2023-01-11 RX ORDER — CEPHALEXIN 500 MG/1
CAPSULE ORAL
Qty: 30 UNSPECIFIED | Status: ACTIVE | COMMUNITY

## 2023-01-11 RX ORDER — FOLIC ACID 1 MG/1
TABLET ORAL
Qty: 90 UNSPECIFIED | Status: ACTIVE | COMMUNITY

## 2023-01-11 RX ORDER — NITROFURANTOIN (MONOHYDRATE/MACROCRYSTALS) 75; 25 MG/1; MG/1
CAPSULE ORAL
Qty: 20 UNSPECIFIED | Status: ACTIVE | COMMUNITY

## 2023-01-11 NOTE — HPI-TODAY'S VISIT:
Pt was seen inpatient at [facility] for anemia and GI bleed. EGD on 11/30/22 showed GAVE that was treated with APC. No signs of bleeding since discharge.  Colonoscopy on 11/17/22 showed a long tortuous colon that appeared normal up to the ascending colon. BE was recommended to complete the evaluation.  She did not get the labs done that were previously ordered to help determine the cause of her cirrhosis.

## 2023-01-11 NOTE — HPI-OTHER HISTORIES
OV 5/25/21:  Pt presents for evaluation of cirrhosis. They were diagnosed with cirrhosis 1 month ago. Cause of cirrhosis is unknown. They currently do not drink alcohol. No significant alcohol use in the past. They deny drug use currently or in the past. Recent labs include CBC with hgb 10.2, MCV 91 and platelet 93,000. Recent imaging includes CT scan on 4/12/21 that showed cirrhosis, gallstones, mild splenomegaly, varices in the LUQ, and bilateral nonobstructing kidney stones. EGD has been done 5years ago in [place] and was reportedly normal. She also reports a normal colonoscopy 5 years ago. Her father had colon cancer. --------------------------------------------------------------------------------------------------- OV 1/20/22: Since last OV, patient has has been hospitalized twice for UGIB/anemia. 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 Her second episode was a hospitalization from 12/13/21 to 12/18/21 for melena/anemia with Hgb on admission found to be 6.8 requiring transfusion with PRBCs with repeat EGD on 12/15/21 with findings of no esophageal varices, gastric ulcer  located at the distal antrum at the greater curvaure with visible vessel s/p endoclip x 2, and mild portal hypertension at the  with mosaic pattern mucosa but no gastric varices. She was discharged home with H/H 8.5/30.4 on atenolol, iron supplement, lactulose, and pantoprazole BID. Today she reports dark brown stools with taking iron but no active signs of bleeding such as hematemesis, melena, or hematochezia. Has not been taking PPI since discharge (off ~ 1 month). Previously ordered labs at last OV to r/o autoimmune or viral hepatitis has not been completed.  Denies CP, SOB, dizziness/lightheadedness, abdominal pain, N/V, wt loss, constipation, or diarrhea. --------------------------------------------------------------------------- Note from OV 7/21/22 with DENIS Perales: Patient presents today for follow up. She underwent repeat EGD on 6/8/22 with findings of no varices, ulcer healed and GAVE with no active bleeding but treated with APC. Most recent labs on 7/2/22 showed H/H improving at 8.4/26.9 along with plt 104, T.bill 1.3 but alk phos, AST, ALT WNL. Today she reports doing well. No active signs of GI bleeding such as hematemesis, melena, or hematochezia. Compliant with taking daily PPI. No evidence of jaundice, ascites, or HE. No new or current GI complaints or concerns.

## 2023-01-11 NOTE — PHYSICAL EXAM CONSTITUTIONAL:
well developed, morbidly obese , in no acute distress , ambulating with walker , normal communication ability

## 2023-01-26 PROBLEM — 123717006 ADVANCED CIRRHOSIS: Status: ACTIVE | Noted: 2023-01-26

## 2023-03-03 ENCOUNTER — TELEPHONE ENCOUNTER (OUTPATIENT)
Dept: URBAN - METROPOLITAN AREA CLINIC 70 | Facility: CLINIC | Age: 68
End: 2023-03-03

## 2023-03-13 ENCOUNTER — OFFICE VISIT (OUTPATIENT)
Dept: URBAN - METROPOLITAN AREA MEDICAL CENTER 42 | Facility: MEDICAL CENTER | Age: 68
End: 2023-03-13

## 2023-03-14 ENCOUNTER — OUT OF OFFICE VISIT (OUTPATIENT)
Dept: URBAN - METROPOLITAN AREA MEDICAL CENTER 42 | Facility: MEDICAL CENTER | Age: 68
End: 2023-03-14
Payer: OTHER GOVERNMENT

## 2023-03-14 DIAGNOSIS — D50.8 ACQUIRED IRON DEFICIENCY ANEMIA DUE TO DECREASED ABSORPTION: ICD-10-CM

## 2023-03-14 DIAGNOSIS — K31.819 ACQUIRED ARTERIOVENOUS MALFORMATION OF DUODENUM: ICD-10-CM

## 2023-03-14 PROCEDURE — 43270 EGD LESION ABLATION: CPT | Performed by: INTERNAL MEDICINE

## 2023-03-14 PROCEDURE — 99232 SBSQ HOSP IP/OBS MODERATE 35: CPT | Performed by: INTERNAL MEDICINE

## 2023-03-16 ENCOUNTER — P2P PATIENT RECORD (OUTPATIENT)
Age: 68
End: 2023-03-16

## 2023-06-13 ENCOUNTER — DASHBOARD ENCOUNTERS (OUTPATIENT)
Age: 68
End: 2023-06-13

## 2023-06-14 ENCOUNTER — OFFICE VISIT (OUTPATIENT)
Dept: URBAN - METROPOLITAN AREA CLINIC 70 | Facility: CLINIC | Age: 68
End: 2023-06-14

## 2023-06-14 RX ORDER — ATENOLOL 50 MG/1
TABLET ORAL
COMMUNITY

## 2023-06-14 RX ORDER — HYDROCODONE BITARTRATE AND ACETAMINOPHEN 7.5; 325 MG/1; MG/1
TABLET ORAL
Qty: 30 UNSPECIFIED | COMMUNITY

## 2023-06-14 RX ORDER — LEVOTHYROXINE SODIUM 0.15 MG/1
TABLET ORAL
Qty: 90 UNSPECIFIED | COMMUNITY

## 2023-06-14 RX ORDER — QUINAPRIL 20 MG/1
TABLET ORAL
Qty: 90 UNSPECIFIED | COMMUNITY

## 2023-06-14 RX ORDER — LACTULOSE 10 G/15ML
15 ML AS NEEDED SOLUTION ORAL ONCE A DAY
COMMUNITY

## 2023-06-14 RX ORDER — SERTRALINE HYDROCHLORIDE 100 MG/1
TABLET, FILM COATED ORAL
Qty: 90 UNSPECIFIED | COMMUNITY

## 2023-06-14 RX ORDER — TORSEMIDE 20 MG/1
TABLET ORAL
Qty: 90 UNSPECIFIED | COMMUNITY

## 2023-06-14 RX ORDER — POTASSIUM CHLORIDE 750 MG/1
CAPSULE, EXTENDED RELEASE ORAL
Qty: 90 UNSPECIFIED | COMMUNITY

## 2023-06-14 RX ORDER — NORTRIPTYLINE HYDROCHLORIDE 50 MG/1
CAPSULE ORAL
Qty: 90 UNSPECIFIED | COMMUNITY

## 2023-06-14 RX ORDER — QUETIAPINE 400 MG/1
TABLET, FILM COATED, EXTENDED RELEASE ORAL
Qty: 90 UNSPECIFIED | COMMUNITY

## 2023-06-14 RX ORDER — TOPIRAMATE 50 MG/1
CAPSULE, EXTENDED RELEASE ORAL
Qty: 180 UNSPECIFIED | COMMUNITY

## 2023-06-14 RX ORDER — HUMAN INSULIN 100 [IU]/ML
INJECTION, SUSPENSION SUBCUTANEOUS
Qty: 60 UNSPECIFIED | COMMUNITY

## 2023-06-14 RX ORDER — NITROFURANTOIN (MONOHYDRATE/MACROCRYSTALS) 75; 25 MG/1; MG/1
CAPSULE ORAL
Qty: 20 UNSPECIFIED | COMMUNITY

## 2023-06-14 RX ORDER — ATORVASTATIN CALCIUM 40 MG/1
TABLET ORAL
Qty: 90 UNSPECIFIED | COMMUNITY

## 2023-06-14 RX ORDER — PANTOPRAZOLE SODIUM 40 MG/1
1 TABLET TABLET, DELAYED RELEASE ORAL TWICE A DAY
COMMUNITY
Start: 2022-01-20

## 2023-06-14 RX ORDER — PEN NEEDLE, DIABETIC 31 GX5/16"
NEEDLE, DISPOSABLE MISCELLANEOUS
Qty: 200 UNSPECIFIED | COMMUNITY

## 2023-06-14 RX ORDER — FOLIC ACID 1 MG/1
TABLET ORAL
Qty: 90 UNSPECIFIED | COMMUNITY

## 2023-06-14 RX ORDER — INSULIN DEGLUDEC INJECTION 200 U/ML
INJECTION, SOLUTION SUBCUTANEOUS
Qty: 30 UNSPECIFIED | COMMUNITY

## 2023-06-14 RX ORDER — NITROFURANTOIN MACROCRYSTALS 100 MG/1
CAPSULE ORAL
Qty: 25 UNSPECIFIED | COMMUNITY

## 2023-06-14 RX ORDER — TRAMADOL HYDROCHLORIDE 50 MG/1
TABLET, COATED ORAL
Qty: 60 UNSPECIFIED | COMMUNITY

## 2023-06-14 RX ORDER — CEPHALEXIN 500 MG/1
CAPSULE ORAL
Qty: 30 UNSPECIFIED | COMMUNITY

## 2023-06-14 NOTE — HPI-TODAY'S VISIT:
Pt was seen inpatient at [facility] for anemia and GI bleed. EGD on 11/30/22 showed GAVE that was treated with APC. No signs of bleeding since discharge.  Colonoscopy on 11/17/22 showed a long tortuous colon that appeared normal up to the ascending colon. BE was recommended to complete the evaluation.  She did not get the labs done that were previously ordered to help determine the cause of her cirrhosis.

## 2023-06-15 ENCOUNTER — P2P PATIENT RECORD (OUTPATIENT)
Age: 68
End: 2023-06-15

## 2023-08-08 ENCOUNTER — P2P PATIENT RECORD (OUTPATIENT)
Age: 68
End: 2023-08-08

## 2023-11-20 ENCOUNTER — OUT OF OFFICE VISIT (OUTPATIENT)
Dept: URBAN - METROPOLITAN AREA MEDICAL CENTER 42 | Facility: MEDICAL CENTER | Age: 68
End: 2023-11-20
Payer: OTHER GOVERNMENT

## 2023-11-20 DIAGNOSIS — Z87.19 HISTORY OF ANGIODYSPLASIA OF INTESTINAL TRACT: ICD-10-CM

## 2023-11-20 DIAGNOSIS — E87.0 HYPERNATREMIA: ICD-10-CM

## 2023-11-20 DIAGNOSIS — K92.2 ACUTE GASTROINTESTINAL BLEEDING: ICD-10-CM

## 2023-11-20 DIAGNOSIS — K74.69 CIRRHOSIS, CRYPTOGENIC: ICD-10-CM

## 2023-11-20 DIAGNOSIS — K76.82 ACUTE HEPATIC ENCEPHALOPATHY: ICD-10-CM

## 2023-11-20 DIAGNOSIS — N18.4 CHRONIC KIDNEY DISEASE, STAGE 4 (SEVERE): ICD-10-CM

## 2023-11-20 PROCEDURE — 99222 1ST HOSP IP/OBS MODERATE 55: CPT | Performed by: INTERNAL MEDICINE

## 2023-11-20 PROCEDURE — 99232 SBSQ HOSP IP/OBS MODERATE 35: CPT | Performed by: INTERNAL MEDICINE

## 2023-11-20 PROCEDURE — G8427 DOCREV CUR MEDS BY ELIG CLIN: HCPCS | Performed by: INTERNAL MEDICINE

## 2023-11-20 PROCEDURE — 99231 SBSQ HOSP IP/OBS SF/LOW 25: CPT | Performed by: INTERNAL MEDICINE

## 2023-12-11 ENCOUNTER — OUT OF OFFICE VISIT (OUTPATIENT)
Dept: URBAN - METROPOLITAN AREA MEDICAL CENTER 42 | Facility: MEDICAL CENTER | Age: 68
End: 2023-12-11
Payer: OTHER GOVERNMENT

## 2023-12-11 DIAGNOSIS — K75.81 NASH (NONALCOHOLIC STEATOHEPATITIS): ICD-10-CM

## 2023-12-11 DIAGNOSIS — K74.69 CIRRHOSIS, CRYPTOGENIC: ICD-10-CM

## 2023-12-11 DIAGNOSIS — K76.82 ACUTE HEPATIC ENCEPHALOPATHY: ICD-10-CM

## 2023-12-11 PROCEDURE — 99232 SBSQ HOSP IP/OBS MODERATE 35: CPT | Performed by: INTERNAL MEDICINE

## 2023-12-13 ENCOUNTER — OUT OF OFFICE VISIT (OUTPATIENT)
Dept: URBAN - METROPOLITAN AREA MEDICAL CENTER 42 | Facility: MEDICAL CENTER | Age: 68
End: 2023-12-13
Payer: OTHER GOVERNMENT

## 2023-12-13 DIAGNOSIS — K76.82 ACUTE HEPATIC ENCEPHALOPATHY: ICD-10-CM

## 2023-12-13 DIAGNOSIS — K74.69 CIRRHOSIS, CRYPTOGENIC: ICD-10-CM

## 2023-12-13 PROCEDURE — 99232 SBSQ HOSP IP/OBS MODERATE 35: CPT | Performed by: INTERNAL MEDICINE

## 2023-12-14 ENCOUNTER — P2P PATIENT RECORD (OUTPATIENT)
Age: 68
End: 2023-12-14

## 2023-12-19 NOTE — PHYSICAL EXAM NEUROLOGIC:
Pt pulled gown and tele off.  Told her nurse Morenita Vyas oriented to person, place and time , normal sensation , short and long term memory intact

## 2023-12-22 ENCOUNTER — OUT OF OFFICE VISIT (OUTPATIENT)
Dept: URBAN - METROPOLITAN AREA MEDICAL CENTER 42 | Facility: MEDICAL CENTER | Age: 68
End: 2023-12-22
Payer: OTHER GOVERNMENT

## 2023-12-22 DIAGNOSIS — K74.69 CIRRHOSIS, CRYPTOGENIC: ICD-10-CM

## 2023-12-22 DIAGNOSIS — K76.82 ACUTE HEPATIC ENCEPHALOPATHY: ICD-10-CM

## 2023-12-22 PROCEDURE — 99232 SBSQ HOSP IP/OBS MODERATE 35: CPT | Performed by: INTERNAL MEDICINE

## 2023-12-22 PROCEDURE — 99222 1ST HOSP IP/OBS MODERATE 55: CPT | Performed by: INTERNAL MEDICINE

## 2023-12-22 PROCEDURE — G8427 DOCREV CUR MEDS BY ELIG CLIN: HCPCS | Performed by: INTERNAL MEDICINE

## 2024-01-29 ENCOUNTER — OFFICE VISIT (OUTPATIENT)
Dept: URBAN - METROPOLITAN AREA CLINIC 70 | Facility: CLINIC | Age: 69
End: 2024-01-29

## 2024-01-31 ENCOUNTER — CLAIMS CREATED FROM THE CLAIM WINDOW (OUTPATIENT)
Dept: URBAN - METROPOLITAN AREA MEDICAL CENTER 42 | Facility: MEDICAL CENTER | Age: 69
End: 2024-01-31
Payer: OTHER GOVERNMENT

## 2024-01-31 DIAGNOSIS — K92.1 ACUTE MELENA: ICD-10-CM

## 2024-01-31 DIAGNOSIS — R71.0 DROP IN HEMOGLOBIN: ICD-10-CM

## 2024-01-31 PROCEDURE — 99253 IP/OBS CNSLTJ NEW/EST LOW 45: CPT | Performed by: INTERNAL MEDICINE

## 2024-01-31 PROCEDURE — 99221 1ST HOSP IP/OBS SF/LOW 40: CPT | Performed by: INTERNAL MEDICINE

## 2024-01-31 PROCEDURE — G8427 DOCREV CUR MEDS BY ELIG CLIN: HCPCS | Performed by: INTERNAL MEDICINE

## 2024-02-01 ENCOUNTER — LAB (OUTPATIENT)
Dept: URBAN - METROPOLITAN AREA MEDICAL CENTER 42 | Facility: MEDICAL CENTER | Age: 69
End: 2024-02-01
Payer: OTHER GOVERNMENT

## 2024-02-01 DIAGNOSIS — K92.1 ACUTE MELENA: ICD-10-CM

## 2024-02-01 DIAGNOSIS — K26.9 DUODENAL ULCER, UNSPECIFIED AS ACUTE OR CHRONIC, WITHOUT HEMORRHAGE OR PERFORATION: ICD-10-CM

## 2024-02-01 DIAGNOSIS — K29.60 ADENOPAPILLOMATOSIS GASTRICA: ICD-10-CM

## 2024-02-01 PROCEDURE — 43235 EGD DIAGNOSTIC BRUSH WASH: CPT | Performed by: INTERNAL MEDICINE

## 2024-02-01 PROCEDURE — 43239 EGD BIOPSY SINGLE/MULTIPLE: CPT | Performed by: INTERNAL MEDICINE

## 2024-02-02 ENCOUNTER — LAB (OUTPATIENT)
Dept: URBAN - METROPOLITAN AREA MEDICAL CENTER 42 | Facility: MEDICAL CENTER | Age: 69
End: 2024-02-02
Payer: OTHER GOVERNMENT

## 2024-02-02 DIAGNOSIS — K26.9 CHILDHOOD DUODENAL ULCER: ICD-10-CM

## 2024-02-02 DIAGNOSIS — D62 ABLA (ACUTE BLOOD LOSS ANEMIA): ICD-10-CM

## 2024-02-02 DIAGNOSIS — K92.1 ACUTE MELENA: ICD-10-CM

## 2024-02-02 PROCEDURE — 99232 SBSQ HOSP IP/OBS MODERATE 35: CPT | Performed by: INTERNAL MEDICINE

## 2024-02-03 ENCOUNTER — LAB (OUTPATIENT)
Dept: URBAN - METROPOLITAN AREA MEDICAL CENTER 42 | Facility: MEDICAL CENTER | Age: 69
End: 2024-02-03
Payer: OTHER GOVERNMENT

## 2024-02-03 DIAGNOSIS — R06.02 SHORTNESS OF BREATH: ICD-10-CM

## 2024-02-03 DIAGNOSIS — K26.9 CHILDHOOD DUODENAL ULCER: ICD-10-CM

## 2024-02-03 DIAGNOSIS — D62 ABLA (ACUTE BLOOD LOSS ANEMIA): ICD-10-CM

## 2024-02-03 PROCEDURE — 99232 SBSQ HOSP IP/OBS MODERATE 35: CPT | Performed by: INTERNAL MEDICINE

## 2024-02-04 ENCOUNTER — LAB (OUTPATIENT)
Dept: URBAN - METROPOLITAN AREA MEDICAL CENTER 42 | Facility: MEDICAL CENTER | Age: 69
End: 2024-02-04
Payer: OTHER GOVERNMENT

## 2024-02-04 DIAGNOSIS — K92.1 ACUTE MELENA: ICD-10-CM

## 2024-02-04 DIAGNOSIS — K26.9 CHILDHOOD DUODENAL ULCER: ICD-10-CM

## 2024-02-04 DIAGNOSIS — D62 ABLA (ACUTE BLOOD LOSS ANEMIA): ICD-10-CM

## 2024-02-04 PROCEDURE — 99232 SBSQ HOSP IP/OBS MODERATE 35: CPT | Performed by: INTERNAL MEDICINE

## 2024-02-05 ENCOUNTER — LAB (OUTPATIENT)
Dept: URBAN - METROPOLITAN AREA MEDICAL CENTER 42 | Facility: MEDICAL CENTER | Age: 69
End: 2024-02-05
Payer: OTHER GOVERNMENT

## 2024-02-05 DIAGNOSIS — D62 ABLA (ACUTE BLOOD LOSS ANEMIA): ICD-10-CM

## 2024-02-05 DIAGNOSIS — K26.9 CHILDHOOD DUODENAL ULCER: ICD-10-CM

## 2024-02-05 DIAGNOSIS — K92.1 MELENA: ICD-10-CM

## 2024-02-05 DIAGNOSIS — K74.69 CIRRHOSIS, CRYPTOGENIC: ICD-10-CM

## 2024-02-05 PROCEDURE — 99232 SBSQ HOSP IP/OBS MODERATE 35: CPT | Performed by: INTERNAL MEDICINE
